# Patient Record
Sex: MALE | Race: BLACK OR AFRICAN AMERICAN | NOT HISPANIC OR LATINO | Employment: UNEMPLOYED | URBAN - METROPOLITAN AREA
[De-identification: names, ages, dates, MRNs, and addresses within clinical notes are randomized per-mention and may not be internally consistent; named-entity substitution may affect disease eponyms.]

---

## 2017-03-28 ENCOUNTER — HOSPITAL ENCOUNTER (EMERGENCY)
Facility: HOSPITAL | Age: 82
Discharge: HOME | End: 2017-03-28
Attending: EMERGENCY MEDICINE
Payer: MEDICARE

## 2017-03-28 VITALS
TEMPERATURE: 100 F | WEIGHT: 182 LBS | DIASTOLIC BLOOD PRESSURE: 63 MMHG | RESPIRATION RATE: 26 BRPM | HEART RATE: 82 BPM | SYSTOLIC BLOOD PRESSURE: 132 MMHG | OXYGEN SATURATION: 99 %

## 2017-03-28 DIAGNOSIS — R31.9 HEMATURIA: ICD-10-CM

## 2017-03-28 DIAGNOSIS — Z99.11 VENTILATOR DEPENDENT: ICD-10-CM

## 2017-03-28 DIAGNOSIS — T83.021A DISLODGED FOLEY CATHETER, INITIAL ENCOUNTER: Primary | ICD-10-CM

## 2017-03-28 PROCEDURE — 99900026 HC AIRWAY MAINTENANCE (STAT)

## 2017-03-28 PROCEDURE — 99284 EMERGENCY DEPT VISIT MOD MDM: CPT | Mod: 25

## 2017-03-28 PROCEDURE — 51702 INSERT TEMP BLADDER CATH: CPT

## 2017-03-28 PROCEDURE — 94002 VENT MGMT INPAT INIT DAY: CPT | Mod: 59

## 2017-03-28 RX ORDER — LORAZEPAM 1 MG/1
1 TABLET ORAL EVERY 6 HOURS PRN
COMMUNITY

## 2017-03-28 RX ORDER — ALBUTEROL SULFATE 2.5 MG/.5ML
2.5 SOLUTION RESPIRATORY (INHALATION) EVERY 4 HOURS PRN
COMMUNITY

## 2017-03-28 RX ORDER — FERROUS SULFATE 220 (44)/5
220 SOLUTION, ORAL ORAL DAILY
COMMUNITY

## 2017-03-28 RX ORDER — OXYCODONE HYDROCHLORIDE 5 MG/1
5 TABLET ORAL EVERY 4 HOURS PRN
COMMUNITY

## 2017-03-28 RX ORDER — ACETAMINOPHEN 325 MG/1
650 TABLET ORAL EVERY 4 HOURS PRN
COMMUNITY

## 2017-03-28 RX ORDER — SCOLOPAMINE TRANSDERMAL SYSTEM 1 MG/1
1 PATCH, EXTENDED RELEASE TRANSDERMAL
COMMUNITY

## 2017-03-28 RX ORDER — TAMSULOSIN HYDROCHLORIDE 0.4 MG/1
0.4 CAPSULE ORAL 2 TIMES DAILY
COMMUNITY
End: 2017-04-21

## 2017-03-28 RX ORDER — METOPROLOL TARTRATE 25 MG/1
25 TABLET, FILM COATED ORAL 2 TIMES DAILY
COMMUNITY

## 2017-03-28 RX ORDER — HEPARIN SODIUM 5000 [USP'U]/ML
5000 INJECTION, SOLUTION INTRAVENOUS; SUBCUTANEOUS EVERY 8 HOURS
COMMUNITY

## 2017-03-28 RX ORDER — OLANZAPINE 5 MG/1
5 TABLET ORAL 2 TIMES DAILY PRN
COMMUNITY

## 2017-03-28 RX ORDER — ASCORBIC ACID 125 MG
1 TABLET,CHEWABLE ORAL DAILY
COMMUNITY

## 2017-03-28 RX ORDER — CHLORHEXIDINE GLUCONATE ORAL RINSE 1.2 MG/ML
15 SOLUTION DENTAL 2 TIMES DAILY
COMMUNITY

## 2017-03-28 RX ORDER — FAMOTIDINE 20 MG/1
20 TABLET, FILM COATED ORAL 2 TIMES DAILY
COMMUNITY

## 2017-03-28 RX ORDER — CITALOPRAM 20 MG/1
20 TABLET, FILM COATED ORAL DAILY
COMMUNITY

## 2017-03-28 RX ORDER — IPRATROPIUM BROMIDE AND ALBUTEROL SULFATE 2.5; .5 MG/3ML; MG/3ML
3 SOLUTION RESPIRATORY (INHALATION) EVERY 6 HOURS PRN
COMMUNITY

## 2017-03-28 RX ORDER — DOCUSATE SODIUM 50 MG/5ML
50 LIQUID ORAL 2 TIMES DAILY
COMMUNITY

## 2017-03-28 NOTE — ED NOTES
Attempting to call [facility] to set up transport back to nursing home. No answer. Will continue to reach pt's nurse at nursing home.

## 2017-03-28 NOTE — DISCHARGE INSTRUCTIONS
Blood in the Urine    Blood in the urine (hematuria) has many possible causes. If it occurs after an injury (such as a car accident or fall), it is most often a sign of bruising to the kidney or bladder. Common causes of blood in the urine include urinary tract infections, kidney stones, inflammation, tumors, or certain other diseases of the kidney or bladder. Menstruation can cause blood to appear in the urine sample, although it is not coming from the urinary tract.  If only a trace amount of blood is present, it will show up on the urine test, even though the urine may be yellow and not pink or red. This may occur with any of the above conditions, as well as heavy exercise or high fever. In this case, your doctor may want to repeat the urine test on another day. This will show if the blood is still present. If it is, then other tests can be done to find out the cause.  Home care  Follow these home care guidelines:  · If your urine does not appear bloody (pink, brown or red) then you do not need to restrict your activity in any way.  · If you can see blood in your urine, rest and avoid heavy exertion until your next exam. Do not use aspirin, blood thinners, or anti-platelet or anti-inflammatory medicines. These include ibuprofen and naproxen. These thin the blood and may increase bleeding.  Follow-up care  Follow up with your healthcare provider, or as advised. If you were injured and had blood in your urine, you should have a repeat urine test in 1 to 2 days. Contact your doctor for this test.  A radiologist will review any X-rays that were taken. You will be told of any new findings that may affect your care.  When to seek medical advice  Call your healthcare provider right away if any of these occur:  · Bright red blood or blood clots in the urine (if you did not have this before)  · Weakness, dizziness or fainting  · New groin, abdominal, or back pain  · Fever of 100.4ºF (38ºC) or higher, or as directed by  your healthcare provider  · Repeated vomiting  · Bleeding from the nose or gums or easy bruising  Date Last Reviewed: 9/1/2016 © 2000-2016 [employer]. [address] [place] [address], [place] [address]. All rights reserved. This information is not intended as a substitute for professional medical care. Always follow your healthcare professional's instructions.

## 2017-03-28 NOTE — ED AVS SNAPSHOT
[facility]  [address] [address] [address]               Hayden Najera   3/28/2017  2:50 AM   ED    Description:  Male : 1931   Department:  [facility]           Your Care was Coordinated By:     Provider Role From To    Elisa Alston DO Attending Provider 17 0252 --      Reason for Visit     Male  Problem           Diagnoses this Visit        Comments    Dislodged Fisher catheter, initial encounter    -  Primary     Hematuria         Ventilator dependent           ED Disposition     ED Disposition Condition Comment    Discharge             To Do List           Follow-up Information     Follow up with Nabor Navarro IV, MD. Schedule an appointment as soon as possible for a visit in 2 days.    Specialty:  Urology    Why:  Return to emergency department for Fisher catheter not draining, fever, difficulty breathing, blood clots in urine, or other concerns.    Contact information:    [address]  [place] [id number] [phone]        [facility]     [facility] Care [facility] -  Assistance  Registered nurses in the [facility] provide clinical advisement, health education, appointment booking, and other advisory services.  Call for this free service at [phone].             Medications           Message regarding Medications     Verify the changes and/or additions to your medication regime listed below are the same as discussed with your clinician today.  If any of these changes or additions are incorrect, please notify your healthcare provider.             Verify that the below list of medications is an accurate representation of the medications you are currently taking.  If none reported, the list may be blank. If incorrect, please contact your healthcare provider. Carry this list with you in case of emergency.           Current Medications     acetaminophen (TYLENOL) 325 MG tablet 650 mg by PEG Tube  route every 4 (four) hours as needed for Pain.    chlorhexidine (PERIDEX) 0.12 % solution Use as directed 15 mLs in the mouth or throat 2 (two) times daily.    cholecalciferol, vitamin D3, 1,000 unit Chew 1 tablet by PEG Tube route once daily.    citalopram (CELEXA) 20 MG tablet 20 mg by PEG Tube route once daily.    docusate (COLACE) 50 mg/5 mL liquid 50 mg by PEG Tube route 2 (two) times daily.    famotidine (PEPCID) 20 MG tablet Take 20 mg by mouth 2 (two) times daily.    ferrous sulfate 220 mg (44 mg iron)/5 mL solution 220 mg by PEG Tube route once daily.    HEPARIN SODIUM,PORCINE (HEPARIN, PORCINE,) 5,000 unit/mL injection Inject 5,000 Units into the skin every 8 (eight) hours.    lorazepam (ATIVAN) 1 MG tablet 1 mg by PEG Tube route every 6 (six) hours as needed for Anxiety.    metoprolol tartrate (LOPRESSOR) 25 MG tablet 25 mg by PEG Tube route 2 (two) times daily.    olanzapine (ZYPREXA) 5 MG tablet 5 mg by PEG Tube route 2 (two) times daily as needed.    oxycodone (ROXICODONE) 5 MG immediate release tablet 5 mg by PEG Tube route every 4 (four) hours as needed for Pain.    scopolamine (TRANSDERM-SCOP) 1.5 mg (1 mg over 3 days) Place 1 patch onto the skin every 72 hours.    sodium chloride (SALINE NASAL) 0.65 % nasal spray 1 spray by Nasal route every 6 (six) hours as needed for Congestion.    tamsulosin (FLOMAX) 0.4 mg Cp24 0.4 mg by PEG Tube route 2 (two) times daily.    theophylline (THEODUR) 200 mg 24 hr capsule 200 mg by PEG Tube route every evening.    albuterol sulfate 2.5 mg/0.5 mL Nebu Take 2.5 mg by nebulization every 4 (four) hours as needed. Rescue    albuterol-ipratropium 2.5mg-0.5mg/3mL (DUONEB) 0.5 mg-3 mg(2.5 mg base)/3 mL nebulizer solution Take 3 mLs by nebulization every 6 (six) hours as needed for Wheezing or Shortness of Breath. Rescue           Clinical Reference Information           Your Vitals Were     BP Pulse Temp Resp Weight SpO2    120/59 (BP Location: Right arm, Patient  Position: Lying) 91 100 °F (37.8 °C) (Oral) 29 82.6 kg (182 lb) 98%      Allergies as of 3/28/2017     No Known Allergies      Immunizations Administered on Date of Encounter - 3/28/2017     None      ED Micro, Lab, POCT     None      ED Imaging Orders     None        Discharge Instructions         Blood in the Urine    Blood in the urine (hematuria) has many possible causes. If it occurs after an injury (such as a car accident or fall), it is most often a sign of bruising to the kidney or bladder. Common causes of blood in the urine include urinary tract infections, kidney stones, inflammation, tumors, or certain other diseases of the kidney or bladder. Menstruation can cause blood to appear in the urine sample, although it is not coming from the urinary tract.  If only a trace amount of blood is present, it will show up on the urine test, even though the urine may be yellow and not pink or red. This may occur with any of the above conditions, as well as heavy exercise or high fever. In this case, your doctor may want to repeat the urine test on another day. This will show if the blood is still present. If it is, then other tests can be done to find out the cause.  Home care  Follow these home care guidelines:  · If your urine does not appear bloody (pink, brown or red) then you do not need to restrict your activity in any way.  · If you can see blood in your urine, rest and avoid heavy exertion until your next exam. Do not use aspirin, blood thinners, or anti-platelet or anti-inflammatory medicines. These include ibuprofen and naproxen. These thin the blood and may increase bleeding.  Follow-up care  Follow up with your healthcare provider, or as advised. If you were injured and had blood in your urine, you should have a repeat urine test in 1 to 2 days. Contact your doctor for this test.  A radiologist will review any X-rays that were taken. You will be told of any new findings that may affect your care.  When to  seek medical advice  Call your healthcare provider right away if any of these occur:  · Bright red blood or blood clots in the urine (if you did not have this before)  · Weakness, dizziness or fainting  · New groin, abdominal, or back pain  · Fever of 100.4ºF (38ºC) or higher, or as directed by your healthcare provider  · Repeated vomiting  · Bleeding from the nose or gums or easy bruising  Date Last Reviewed: 9/1/2016 © 2000-2016 [employer]. [address] [place] [address]. All rights reserved. This information is not intended as a substitute for professional medical care. Always follow your healthcare professional's instructions.          Discharge References/Attachments     HEMATURIA (ENGLISH)    FONTANEZ CATHETER, CARE (ENGLISH)    INDWELLING URINARY CATHETER, DISCHARGE INSTRUCTIONS (ENGLISH)      [facility] Sign-Up     Activating your [facility] account is as easy as 1-2-3!     1) Visit [email or web address], select Sign Up Now, enter this activation code and your date of birth, then select Next.  [email or web address]  Expires: 5/12/2017  3:47 AM      2) Create a username and password to use when you visit [facility] in the future and select a security question in case you lose your password and select Next.    3) Enter your e-mail address and click Sign Up!    Additional Information  If you have questions, please e-mail [email or web address] or call [phone] to talk to our [facility] staff. Remember, [facility] is NOT to be used for urgent needs. For medical emergencies, dial 911.         Smoking Cessation     If you would like to quit smoking:   You may be eligible for free services if you are a [place] resident and started smoking cigarettes before September 1, 1988.  Call the Smoking Cessation Trust (SCT) toll free at (277) 472-0352 or (059) 868-6405.   Call 3-901-QUIT-NOW if you do not meet the above criteria.             [facility] complies with applicable Federal civil  rights laws and does not discriminate on the basis of race, color, national origin, age, disability, or sex.        Language Assistance Services     ATTENTION: Language assistance services are available, free of charge. Please call [phone].      ATENCIÓN: Si habla shivam, tiene a montesinos disposición servicios gratuitos de asistencia lingüística. Llame al [phone].     CHÚ Ý: N?u b?n nói Ti?ng Vi?t, có các d?ch v? h? tr? ngôn ng? mi?n phí dành cho b?n. G?i s? [phone].

## 2017-03-28 NOTE — ED NOTES
Report was given to HUMZA Figueroa at [facility]. Henry will set up transport for pt.

## 2017-03-28 NOTE — ED NOTES
Attempted to contact pt's nurse at [facility] several times with no answer. KARLA Babcock attempted to contact pt's nurse at [facility]. Will continue to try to get in touch with pt's nurse.

## 2017-03-28 NOTE — ED NOTES
16 Kiswahili coude cath inserted by KARLA Babcock using sterile technique. Clean brief placed on pt.

## 2017-03-28 NOTE — ED PROVIDER NOTES
Encounter Date: 3/28/2017       History     Chief Complaint   Patient presents with    Male  Problem     Per nursing home, pt pulled out mays catheter and has had bleeding from penis.     Review of patient's allergies indicates:  Allergies not on file  HPI Comments: CHIEF COMPLIANT: Male  Problem (Per nursing home, pt pulled out mays catheter and has had bleeding from penis.)      3/28/2017, 2:53 AM     The history is provided by the nursing home notes and [facility] . Reinaldo Blake is a 85 y.o. male  Ventilator dependant male for respiratory failure presenting to the ED for bleeding from his penis.  He had a 16 Serbian Mays catheter in place.  Patient reportedly pulled the catheter out.  Patient is nonverbal.  According to nursing home notes and [facility] staff, patient is at baseline.  There's been no temperature no fever, no cough, no diarrhea, no vomiting.   Unable to obtain further history as patient is vent dependent.     PCP: Dread Pedraza MD  Specialist:   3    The history is provided by the patient.     Past Medical History:   Diagnosis Date    Anemia     Anxiety     Aphonia     BPH (benign prostatic hyperplasia)     Chronic indwelling Mays catheter     Constipation     COPD (chronic obstructive pulmonary disease)     Dependence on ventilator, status     Depression     Dysphagia     Gastrostomy status     GERD (gastroesophageal reflux disease)     Hypertension     Major depressive disorder     Respiratory failure     Tracheostomy status     Vitamin D deficiency      Past Surgical History:   Procedure Laterality Date    GASTROSTOMY      GASTROSTOMY TUBE PLACEMENT      TRACHEOSTOMY TUBE PLACEMENT       History reviewed. No pertinent family history.  Social History   Substance Use Topics    Smoking status: Former Smoker     Types: Cigarettes    Smokeless tobacco: Never Used    Alcohol use No     Review of Systems   Unable to perform ROS: Intubated (Trach, vent  dependent)       Physical Exam   Initial Vitals   BP Pulse Resp Temp SpO2   -- -- -- -- --            Vitals:    03/28/17 0252 03/28/17 0306 03/28/17 0347 03/28/17 0351   BP: (!) 120/59  131/64    Pulse: 80 91 83 79   Resp: 18 (!) 29 (!) 21 19   Temp: 100 °F (37.8 °C)      TempSrc: Oral      SpO2: 100% 98% (!) 78% 100%   Weight: 82.6 kg (182 lb)       03/28/17 0359   BP:    Pulse:    Resp:    Temp: 99.6 °F (37.6 °C)   TempSrc: Oral   SpO2:    Weight:      Vitals:    03/28/17 0252 03/28/17 0306 03/28/17 0347 03/28/17 0351   BP: (!) 120/59  131/64    Pulse: 80 91 83 79   Resp: 18 (!) 29 (!) 21 19   Temp: 100 °F (37.8 °C)      TempSrc: Oral      SpO2: 100% 98% (!) 78% 100%   Weight: 82.6 kg (182 lb)       03/28/17 0359   BP:    Pulse:    Resp:    Temp: 99.6 °F (37.6 °C)   TempSrc: Oral   SpO2:    Weight:          Physical Exam    Nursing note and vitals reviewed.  Constitutional: He appears well-developed and well-nourished.   Obese   HENT:   Head: Normocephalic and atraumatic.   Nose: Nose normal.   Eyes: Conjunctivae are normal. Pupils are equal, round, and reactive to light.   Neck: Normal range of motion.   Trach present   Cardiovascular: Normal rate, regular rhythm and normal heart sounds. Exam reveals no friction rub.    No murmur heard.  Pulmonary/Chest: Breath sounds normal. No respiratory distress. He has no wheezes. He has no rhonchi. He has no rales.   Abdominal: Soft. Bowel sounds are normal. He exhibits no distension and no mass. There is no tenderness. There is no rebound and no guarding.   Gastrostomy tube is present.    Genitourinary:   Genitourinary Comments: Patient is circumcised.  Blood in adult diaper.  Appears the meatus is elongated with smooth edges.  At the base, there is bleeding present.    Neurological: He is alert.   Patient is nonverbal.  Looking around the room.  Not following commands.   Skin: Skin is warm. No erythema.   Psychiatric:   Unable to assess mood.             ED Course    Procedures  Labs Reviewed - No data to display                            ED Course   3:10 AM Attempted to replace 16 Hungarian mays.    Mays inserted until gentle resistence, met with return to blood.     3:30 AM Discussed with Dr. Navarro regarding return of blood.    Per his discussion, and placing a coudé catheter.  At this point time, no need for a urethrogram.    3:45 AM Coude catheter placed without difficulty.    Color but quickly cleared.    Medications - No data to display    3:46 AM Reassessment: Dr. Alston reassessed the pt.  The pt is resting comfortably and is NAD.  Pt states their sx have improved. Discussed test results, shared treatment plan, specific conditions for return, and the need for f/u.  Answered their questions at this time.  Pt understands and agrees to the plan.  The pt has remained hemodynamically stable through ED course and is stable for discharge.     Follow-up Information     Follow up with Nabor Navarro IV, MD. Schedule an appointment as soon as possible for a visit in 2 days.    Specialty:  Urology    Why:  Return to emergency department for Mays catheter not draining, fever, difficulty breathing, blood clots in urine, or other concerns.    Contact information:    [address]  [place] [address] [phone]              New Prescriptions    No medications on file        Discontinued Medications    No medications on file         Clinical Impression:       ICD-10-CM ICD-9-CM   1. Dislodged Mays catheter, initial encounter T83.021A 996.31   2. Hematuria R31.9 599.70   3. Ventilator dependent Z99.11 V46.11         Disposition:   Disposition: Discharged  Condition: Stable            Elisa Alston, DO  03/28/17 0403

## 2017-03-28 NOTE — ED NOTES
Fisher catheter inserted at this time per MD Alston order. Pt tolerated. + urine return noted. Sterile technique utilized throughout procedure.

## 2017-03-28 NOTE — ED NOTES
Pt attempting to pull on vent tubing. Pt directed to quit pulling on tubing. Pt's room light dimmed for comfort. Bed locked in lowest position, side rails up X2. Will continue to monitor.

## 2017-03-28 NOTE — ED NOTES
Patient arrived to ER via ambulance from nursing home. Patient has a trach and is ventilator dependent. Patient placed on  with the following settings: A/C , Vt 400, rate 16, peep +5 and FIO2 40%. Patient will remain on Vent while in ER.

## 2017-04-02 ENCOUNTER — HOSPITAL ENCOUNTER (EMERGENCY)
Facility: HOSPITAL | Age: 82
Discharge: OTHER INSTITUTION | End: 2017-04-02
Attending: EMERGENCY MEDICINE
Payer: MEDICARE

## 2017-04-02 VITALS
TEMPERATURE: 99 F | OXYGEN SATURATION: 100 % | RESPIRATION RATE: 20 BRPM | SYSTOLIC BLOOD PRESSURE: 122 MMHG | HEART RATE: 80 BPM | WEIGHT: 170 LBS | DIASTOLIC BLOOD PRESSURE: 65 MMHG

## 2017-04-02 DIAGNOSIS — K94.23 PEG TUBE MALFUNCTION: Primary | ICD-10-CM

## 2017-04-02 PROCEDURE — 49450 REPLACE G/C TUBE PERC: CPT

## 2017-04-02 PROCEDURE — 99284 EMERGENCY DEPT VISIT MOD MDM: CPT | Mod: 25

## 2017-04-02 PROCEDURE — 43760 *HC REPLACEMENT GASTROS TUBE: CPT

## 2017-04-02 NOTE — ED AVS SNAPSHOT
[facility]  [address] [address] [address]               Hayden Najera   2017  1:40 AM   ED    Description:  Male : 1931   Department:  [facility]           Your Care was Coordinated By:     Provider Role From To    Saul Escobar MD Attending Provider 17 0145 --      Reason for Visit     PEG tube removed           Diagnoses this Visit        Comments    PEG tube malfunction    -  Primary       ED Disposition     ED Disposition Condition Comment    Discharge             To Do List           Follow-up Information     Follow up with Dread Pedraza MD. Call in 1 day.    Specialty:  Internal Medicine    Why:  As needed    Contact information:    [address] [facility]  [address]  [facility] [address]          Follow up with [facility].    Specialty:  Emergency Medicine    Why:  If symptoms worsen    Contact information:    [address] [address] [id number] [phone]      [facility]     [facility] Care [facility] -  Assistance  Unless otherwise directed by your provider, please contact [facility], our nurse care line that is available for  assistance.     Registered nurses in the [facility] provide: appointment scheduling, clinical advisement, health education, and other advisory services.  Call: [phone] (toll free)               Medications           Message regarding Medications     Verify the changes and/or additions to your medication regime listed below are the same as discussed with your clinician today.  If any of these changes or additions are incorrect, please notify your healthcare provider.             Verify that the below list of medications is an accurate representation of the medications you are currently taking.  If none reported, the list may be blank. If incorrect, please contact your healthcare provider. Carry this list with you in case of  emergency.           Current Medications     acetaminophen (TYLENOL) 325 MG tablet 650 mg by PEG Tube route every 4 (four) hours as needed for Pain.    albuterol sulfate 2.5 mg/0.5 mL Nebu Take 2.5 mg by nebulization every 4 (four) hours as needed. Rescue    albuterol-ipratropium 2.5mg-0.5mg/3mL (DUONEB) 0.5 mg-3 mg(2.5 mg base)/3 mL nebulizer solution Take 3 mLs by nebulization every 6 (six) hours as needed for Wheezing or Shortness of Breath. Rescue    chlorhexidine (PERIDEX) 0.12 % solution Use as directed 15 mLs in the mouth or throat 2 (two) times daily.    cholecalciferol, vitamin D3, 1,000 unit Chew 1 tablet by PEG Tube route once daily.    citalopram (CELEXA) 20 MG tablet 20 mg by PEG Tube route once daily.    docusate (COLACE) 50 mg/5 mL liquid 50 mg by PEG Tube route 2 (two) times daily.    famotidine (PEPCID) 20 MG tablet Take 20 mg by mouth 2 (two) times daily.    ferrous sulfate 220 mg (44 mg iron)/5 mL solution 220 mg by PEG Tube route once daily.    HEPARIN SODIUM,PORCINE (HEPARIN, PORCINE,) 5,000 unit/mL injection Inject 5,000 Units into the skin every 8 (eight) hours.    lorazepam (ATIVAN) 1 MG tablet 1 mg by PEG Tube route every 6 (six) hours as needed for Anxiety.    metoprolol tartrate (LOPRESSOR) 25 MG tablet 25 mg by PEG Tube route 2 (two) times daily.    olanzapine (ZYPREXA) 5 MG tablet 5 mg by PEG Tube route 2 (two) times daily as needed.    oxycodone (ROXICODONE) 5 MG immediate release tablet 5 mg by PEG Tube route every 4 (four) hours as needed for Pain.    scopolamine (TRANSDERM-SCOP) 1.5 mg (1 mg over 3 days) Place 1 patch onto the skin every 72 hours.    sodium chloride (SALINE NASAL) 0.65 % nasal spray 1 spray by Nasal route every 6 (six) hours as needed for Congestion.    tamsulosin (FLOMAX) 0.4 mg Cp24 0.4 mg by PEG Tube route 2 (two) times daily.    theophylline (THEODUR) 200 mg 24 hr capsule 200 mg by PEG Tube route every evening.           Clinical Reference Information            Your Vitals Were     BP Pulse Temp Resp Weight SpO2    122/65 (BP Location: Left arm, Patient Position: Sitting) 80 99.2 °F (37.3 °C) 20 77.1 kg (170 lb) 100%      Allergies as of 4/2/2017     No Known Allergies      Immunizations Administered on Date of Encounter - 4/2/2017     None      ED Micro, Lab, POCT     None      ED Imaging Orders     Start Ordered       Status Ordering Provider    04/02/17 0147 04/02/17 0146  X-Ray Abdomen AP 1 View (KUB)  1 time imaging      Ordered       Discharge References/Attachments     FEEDING TUBE REPLACEMENT (ENGLISH)      [facility] Sign-Up     Activating your [facility] account is as easy as 1-2-3!     1) Visit [email or web address], select Sign Up Now, enter this activation code and your date of birth, then select Next.  [email or web address]  Expires: 5/12/2017  3:47 AM      2) Create a username and password to use when you visit [facility] in the future and select a security question in case you lose your password and select Next.    3) Enter your e-mail address and click Sign Up!    Additional Information  If you have questions, please e-mail [email or web address] or call [phone] to talk to our [facility] staff. Remember, [facility] is NOT to be used for urgent needs. For medical emergencies, dial 911.          [facility] complies with applicable Federal civil rights laws and does not discriminate on the basis of race, color, national origin, age, disability, or sex.        Language Assistance Services     ATTENTION: Language assistance services are available, free of charge. Please call [phone].      ATENCIÓN: Si habla español, tiene a montesinos disposición servicios gratuitos de asistencia lingüística. Llame al [phone].     CHÚ Ý: N?u b?n nói Ti?ng Vi?t, có các d?ch v? h? tr? ngôn ng? mi?n phí dành cho b?n. G?i s? [phone].

## 2017-04-02 NOTE — ED NOTES
PEG tube (16) replaced per ERMD.  50ML  gastrograffin instilled per PEG & xray verified placement per ERMD
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
## 2017-04-02 NOTE — ED PROVIDER NOTES
Encounter Date: 4/2/2017       History     Chief Complaint   Patient presents with    PEG tube removed     reported by [facility], pt pulled out, balloon intact     Review of patient's allergies indicates:  No Known Allergies  HPI   Pt with PEG tube dislodged for 1.5 hours pta. Pt arrived via EMS s c/o.     Past Medical History:   Diagnosis Date    Anemia     Anxiety     Aphonia     BPH (benign prostatic hyperplasia)     Chronic indwelling Fisher catheter     Constipation     COPD (chronic obstructive pulmonary disease)     Dependence on ventilator, status     Depression     Dysphagia     Gastrostomy status     GERD (gastroesophageal reflux disease)     Hypertension     Major depressive disorder     Respiratory failure     Tracheostomy status     Vitamin D deficiency      Past Surgical History:   Procedure Laterality Date    GASTROSTOMY      GASTROSTOMY TUBE PLACEMENT      TRACHEOSTOMY TUBE PLACEMENT       History reviewed. No pertinent family history.  Social History   Substance Use Topics    Smoking status: Former Smoker     Types: Cigarettes    Smokeless tobacco: Never Used    Alcohol use No     Review of Systems   Gastrointestinal:        PEG tube dislodged   All other systems reviewed and are negative.      Physical Exam   Initial Vitals   BP Pulse Resp Temp SpO2   04/02/17 0140 04/02/17 0140 04/02/17 0140 04/02/17 0140 04/02/17 0140   122/65 80 20 99.2 °F (37.3 °C) 100 %     Physical Exam    Nursing note and vitals reviewed.  Constitutional: He appears well-developed and well-nourished. No distress.   HENT:   Head: Normocephalic and atraumatic.   Eyes: EOM are normal. Pupils are equal, round, and reactive to light.   Cardiovascular: Normal rate and regular rhythm.   Pulmonary/Chest: Breath sounds normal. No respiratory distress.   Abdominal: Soft. Bowel sounds are normal.   Patent track gastric tube- epigastrum   Musculoskeletal: Normal range of motion.   Neurological: He is  alert. He has normal strength.   Skin: Skin is warm and dry.   Psychiatric: He has a normal mood and affect.         ED Course   Procedures  Labs Reviewed - No data to display     1:50 AM Replaced PEG tube with 16 Turkish PEG tube inflated with 20 ml Saline.   2:11 AM KUB c Gastrograffin : confirmed gastric position of PEG tube.                       ED Course     Clinical Impression:   The encounter diagnosis was PEG tube malfunction.    Disposition:   Disposition: Discharged  Condition: Stable       Saul Escobar MD  04/02/17 0212

## 2017-04-13 ENCOUNTER — TELEPHONE (OUTPATIENT)
Dept: UROLOGY | Facility: CLINIC | Age: 82
End: 2017-04-13

## 2017-04-21 ENCOUNTER — OFFICE VISIT (OUTPATIENT)
Dept: UROLOGY | Facility: CLINIC | Age: 82
End: 2017-04-21
Payer: MEDICARE

## 2017-04-21 VITALS — DIASTOLIC BLOOD PRESSURE: 78 MMHG | HEART RATE: 68 BPM | SYSTOLIC BLOOD PRESSURE: 118 MMHG

## 2017-04-21 DIAGNOSIS — R33.9 URINARY RETENTION: Primary | ICD-10-CM

## 2017-04-21 PROCEDURE — 51702 INSERT TEMP BLADDER CATH: CPT | Mod: S$PBB,,, | Performed by: UROLOGY

## 2017-04-21 PROCEDURE — 99204 OFFICE O/P NEW MOD 45 MIN: CPT | Mod: S$PBB,25,, | Performed by: UROLOGY

## 2017-04-21 NOTE — PROGRESS NOTES
Catheter change ordered per Dr. Navarro. Patient had a 16 fr indwelling mays catheter with 30 ml balloon. I deflated the 30 ml balloon and withdrew catheter from patient's bladder. Using sterile technique, I inserted a 16fr indwelling mays catheter with 10ml bulb into patient's bladder.  Noticed clear, yellow urine return and inflated 10ml bulb. Patient tolerated well.

## 2017-04-21 NOTE — PROGRESS NOTES
Chief Complaint: Urine Retention    HPI:   4/21/17: Aphasic 87 yo man sent by his nursing home without escort or historical information as followup to an accidental mays removal last month.  He was seen in the ER on that occasion and discharged with a new mays.  Discussed pt with the nursing director at his home.  No acute complaints she knew about.  Basically a hospital followup.  Unknown last time to mays change.  Mays not positioned correctly in leg-clip.  Is on flomax.      Allergies:  Review of patient's allergies indicates no known allergies.    Medications: has a current medication list which includes the following prescription(s): acetaminophen, albuterol sulfate, albuterol-ipratropium 2.5mg-0.5mg/3ml, chlorhexidine, cholecalciferol (vitamin d3), citalopram, docusate, famotidine, ferrous sulfate, heparin (porcine), lorazepam, metoprolol tartrate, olanzapine, oxycodone, scopolamine, sodium chloride, tamsulosin, and theophylline.    Review of Systems:  General: No fever, chills, fatigability, or weight loss.  Skin: No rashes, itching, or changes in color or texture of skin.  Chest: Denies HAIDER, cyanosis, wheezing, cough, and sputum production. (on O2-trach)  Abdomen: Appetite fine. No weight loss. Denies diarrhea, abdominal pain, hematemesis, or blood in stool. (Peg tube)  Musculoskeletal: No joint stiffness or swelling. Denies back pain.  : As above.  All other review of systems negative.    PMH:   has a past medical history of Anemia; Anxiety; Aphonia; BPH (benign prostatic hyperplasia); Chronic indwelling Mays catheter; Constipation; COPD (chronic obstructive pulmonary disease); Dependence on ventilator, status; Depression; Dysphagia; Gastrostomy status; GERD (gastroesophageal reflux disease); Hypertension; Major depressive disorder; Respiratory failure; Tracheostomy status; and Vitamin D deficiency.    PSH:   has a past surgical history that includes Tracheostomy tube placement; Gastrostomy; and  Gastrostomy tube placement.    FamHx: family history is not on file.    SocHx:  reports that he has quit smoking. His smoking use included Cigarettes. He has never used smokeless tobacco. He reports that he does not drink alcohol or use illicit drugs.     Physical Exam:  Vitals: There were no vitals filed for this visit.  General: A&Ox3. No apparent distress. No deformities.  Neck: No masses. Normal thyroid.  Lungs: normal inspiration. No use of accessory muscles.  Heart: normal pulse. No arrhythmias.  Abdomen: Soft. NT. ND. No masses. No hernias. No hepatosplenomegaly.  Lymphatic: Neck and groin nodes negative.  Skin: The skin is warm and dry. No jaundice.  Ext: No c/c/e.  : Test desc mannie, mays in bladder but improperly secured.  Changed mays in office today.  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      Labs/Studies:     Impression/Plan:   1. No acute problems appreciated except malpositioned, old mays.  Replaced and secured properly.  Mays change monthly.  RTC prn.  2. Stop flomax - no need.

## 2017-04-23 ENCOUNTER — HOSPITAL ENCOUNTER (EMERGENCY)
Facility: HOSPITAL | Age: 82
Discharge: HOME | End: 2017-04-23
Attending: EMERGENCY MEDICINE
Payer: MEDICARE

## 2017-04-23 VITALS
HEART RATE: 92 BPM | OXYGEN SATURATION: 97 % | TEMPERATURE: 98 F | HEIGHT: 63 IN | BODY MASS INDEX: 30.12 KG/M2 | WEIGHT: 170 LBS | SYSTOLIC BLOOD PRESSURE: 146 MMHG | DIASTOLIC BLOOD PRESSURE: 79 MMHG | RESPIRATION RATE: 20 BRPM

## 2017-04-23 DIAGNOSIS — Z46.59 ENCOUNTER FOR FEEDING TUBE PLACEMENT: Primary | ICD-10-CM

## 2017-04-23 PROCEDURE — 99284 EMERGENCY DEPT VISIT MOD MDM: CPT | Mod: 25

## 2017-04-23 PROCEDURE — 25500020 PHARM REV CODE 255: Performed by: EMERGENCY MEDICINE

## 2017-04-23 PROCEDURE — 43760 *HC REPLACEMENT GASTROS TUBE: CPT

## 2017-04-23 RX ADMIN — DIATRIZOATE MEGLUMINE AND DIATRIZOATE SODIUM 50 ML: 600; 100 SOLUTION ORAL; RECTAL at 11:04

## 2017-04-23 NOTE — ED AVS SNAPSHOT
[facility]  [address] [address] [place] [address]               Hayden Najera   2017 10:45 AM   ED    Description:  Male : 1931   Department:  [facility]           Your Care was Coordinated By:     Provider Role From To    Nathanael Stewart Jr., MD Attending Provider 17 1049 --      Reason for Visit     PEG tube replacement           Diagnoses this Visit        Comments    Encounter for feeding tube placement    -  Primary       ED Disposition     ED Disposition Condition Comment    Discharge             To Do List           Follow-up Information     Follow up with Dread Pedraza MD.    Specialty:  Internal Medicine    Why:  As needed    Contact information:    [address]  [facility] [address]        [facility]     [facility] On [facility] Nurse Care Line -  Assistance  Unless otherwise directed by your provider, please contact [facility], our nurse care line that is available for  assistance.     Registered nurses in the [facility] provide: appointment scheduling, clinical advisement, health education, and other advisory services.  Call: [phone] (toll free)               Medications           Message regarding Medications     Verify the changes and/or additions to your medication regime listed below are the same as discussed with your clinician today.  If any of these changes or additions are incorrect, please notify your healthcare provider.             Verify that the below list of medications is an accurate representation of the medications you are currently taking.  If none reported, the list may be blank. If incorrect, please contact your healthcare provider. Carry this list with you in case of emergency.           Current Medications     acetaminophen (TYLENOL) 325 MG tablet 650 mg by PEG Tube route every 4 (four) hours as needed for Pain.    albuterol sulfate 2.5 mg/0.5 mL Nebu Take 2.5 mg by  "nebulization every 4 (four) hours as needed. Rescue    albuterol-ipratropium 2.5mg-0.5mg/3mL (DUONEB) 0.5 mg-3 mg(2.5 mg base)/3 mL nebulizer solution Take 3 mLs by nebulization every 6 (six) hours as needed for Wheezing or Shortness of Breath. Rescue    chlorhexidine (PERIDEX) 0.12 % solution Use as directed 15 mLs in the mouth or throat 2 (two) times daily.    cholecalciferol, vitamin D3, 1,000 unit Chew 1 tablet by PEG Tube route once daily.    citalopram (CELEXA) 20 MG tablet 20 mg by PEG Tube route once daily.    docusate (COLACE) 50 mg/5 mL liquid 50 mg by PEG Tube route 2 (two) times daily.    famotidine (PEPCID) 20 MG tablet Take 20 mg by mouth 2 (two) times daily.    ferrous sulfate 220 mg (44 mg iron)/5 mL solution 220 mg by PEG Tube route once daily.    HEPARIN SODIUM,PORCINE (HEPARIN, PORCINE,) 5,000 unit/mL injection Inject 5,000 Units into the skin every 8 (eight) hours.    lorazepam (ATIVAN) 1 MG tablet 1 mg by PEG Tube route every 6 (six) hours as needed for Anxiety.    metoprolol tartrate (LOPRESSOR) 25 MG tablet 25 mg by PEG Tube route 2 (two) times daily.    olanzapine (ZYPREXA) 5 MG tablet 5 mg by PEG Tube route 2 (two) times daily as needed.    oxycodone (ROXICODONE) 5 MG immediate release tablet 5 mg by PEG Tube route every 4 (four) hours as needed for Pain.    scopolamine (TRANSDERM-SCOP) 1.5 mg (1 mg over 3 days) Place 1 patch onto the skin every 72 hours.    sodium chloride (SALINE NASAL) 0.65 % nasal spray 1 spray by Nasal route every 6 (six) hours as needed for Congestion.    theophylline (THEODUR) 200 mg 24 hr capsule 200 mg by PEG Tube route every evening.           Clinical Reference Information           Your Vitals Were     BP Pulse Temp Resp Height Weight    146/79 (BP Location: Right arm, Patient Position: Sitting) 92 98.4 °F (36.9 °C) (Axillary) 20 5' 3" (1.6 m) 77.1 kg (170 lb)    SpO2 BMI             97% 30.11 kg/m2         Allergies as of 4/23/2017     No Known Allergies    "   Immunizations Administered on Date of Encounter - 4/23/2017     None      ED Micro, Lab, POCT     None      ED Imaging Orders     Start Ordered       Status Ordering Provider    04/23/17 1051 04/23/17 1050  XR Gastric Tube Check With Contrast  1 time imaging      Acknowledged       Discharge References/Attachments     PEG TUBE FEEDING, UNDERSTANDING (ENGLISH)    FEEDING TUBE REPLACEMENT (ENGLISH)      Smoking Cessation     If you would like to quit smoking:   You may be eligible for free services if you are a [place] resident and started smoking cigarettes before September 1, 1988.  Call the Smoking Cessation Trust ([facility]) toll free at [phone] or [phone].   Call 5-849-QUIT-NOW if you do not meet the above criteria.   Contact us via email: [email or web address]   View our website for more information: [email or web address]         [facility] complies with applicable Federal civil rights laws and does not discriminate on the basis of race, color, national origin, age, disability, or sex.        Language Assistance Services     ATTENTION: Language assistance services are available, free of charge. Please call [phone].      ATENCIÓN: Si habla español, tiene a montesinos disposición servicios gratuitos de asistencia lingüística. Llame al [phone].     CHÚ Ý: N?u b?n nói Ti?ng Vi?t, có các d?ch v? h? tr? ngôn ng? mi?n phí dành cho b?n. G?i s? [phone].

## 2017-04-23 NOTE — ED NOTES
Xray done and Dr. Stewart verified in place. pts tube flushed well per RT post gastro graffin solution.dry new dsg applied and secured with paper tape.

## 2017-04-23 NOTE — ED PROVIDER NOTES
Encounter Date: 4/23/2017       History     Chief Complaint   Patient presents with    PEG tube replacement     Review of patient's allergies indicates:  No Known Allergies  HPI Comments: 87 y/o male bedbound nursing homoe resident here after his feedin tube was pulled out this AM. Pt here for feeding tube replacement pt chornically debilitated with peg/ trach with no acute complaint pt at baseline per paramedics    Patient is a 86 y.o. male presenting with the following complaint: GI illness. The history is provided by the nursing home.   GI Problem   The other symptoms of the illness do not include fever, nausea or vomiting.   The patient has not had a change in bowel habit. Risk factors for an acute abdominal problem include being elderly.     Past Medical History:   Diagnosis Date    Anemia     Anxiety     Aphonia     BPH (benign prostatic hyperplasia)     Chronic indwelling Fisher catheter     Constipation     COPD (chronic obstructive pulmonary disease)     Dependence on ventilator, status     Depression     Dysphagia     Gastrostomy status     GERD (gastroesophageal reflux disease)     Hypertension     Major depressive disorder     Respiratory failure     Tracheostomy status     Vitamin D deficiency      Past Surgical History:   Procedure Laterality Date    GASTROSTOMY      GASTROSTOMY TUBE PLACEMENT      TRACHEOSTOMY TUBE PLACEMENT       History reviewed. No pertinent family history.  Social History   Substance Use Topics    Smoking status: Former Smoker     Types: Cigarettes    Smokeless tobacco: Never Used    Alcohol use No     Review of Systems   Constitutional: Negative for fever.   Gastrointestinal: Negative for nausea and vomiting.   All other systems reviewed and are negative.      Physical Exam   Initial Vitals   BP Pulse Resp Temp SpO2   04/23/17 1048 04/23/17 1048 04/23/17 1048 04/23/17 1048 04/23/17 1048   146/79 92 20 98.4 °F (36.9 °C) 97 %     Physical Exam    Nursing note  "and vitals reviewed.  Constitutional: He appears well-developed and well-nourished.   Debilitated elderly male  S/p peg and tracheostomy here for peg tube replacement in no distress   HENT:   Head: Normocephalic and atraumatic.   Eyes: Conjunctivae and EOM are normal. Pupils are equal, round, and reactive to light.   Neck: Normal range of motion. Neck supple.   Cardiovascular: Normal rate, regular rhythm, normal heart sounds and intact distal pulses.   Pulmonary/Chest: Breath sounds normal.   Abdominal: Soft. Bowel sounds are normal. He exhibits no distension and no mass. There is no tenderness. There is no rebound and no guarding.   Feeding tube stoma intact with minimal breakdown   Musculoskeletal: Normal range of motion.   Neurological: He is alert and oriented to person, place, and time. He has normal strength and normal reflexes.   Skin: Skin is warm and dry.   Psychiatric: He has a normal mood and affect. His behavior is normal. Judgment and thought content normal.         ED Course   Feeding Tube  Date/Time: 4/23/2017 10:58 AM  Performed by: DARÍO ROCHE JR  Authorized by: DARÍO ROCHE JR   Consent: The procedure was performed in an emergent situation. Verbal consent not obtained. Written consent not obtained.  Risks and benefits: risks, benefits and alternatives were discussed  Patient understanding: patient states understanding of the procedure being performed  Patient identity confirmed: verbally with patient  Time out: Immediately prior to procedure a "time out" was called to verify the correct patient, procedure, equipment, support staff and site/side marked as required.  Indications: tube removed by patient  Preparation: Patient was prepped and draped in the usual sterile fashion.  Sedation:  Patient sedated: no    Local anesthesia used: no    Anesthesia:  Local anesthesia used: no  Tube type: gastrostomy  Patient position: sitting  Procedure type: replacement  Endoscope used: no  Bulb inflation " fluid: normal saline  Placement/position confirmation: x-ray, auscultation and gastric contents aspirated  Tube placement difficulty: none  Complications: No  Estimated blood loss (mL): 0  Specimens: No  Implants: No  Patient tolerance: Patient tolerated the procedure well with no immediate complications        Labs Reviewed - No data to display                 Vitals:    04/23/17 1048   BP: (!) 146/79   Pulse: 92   Resp: 20   Temp: 98.4 °F (36.9 °C)       Imaging Results         XR Gastric Tube Check With Contrast (Final result) Result time:  04/23/17 11:18:49    Final result by Danie Natarajan MD (04/23/17 11:18:49)    Impression:           1. Negative for acute process involving the abdomen or pelvis.  2.  The contrast injected into the PEG tube flows into the bowel without extravasation  3. Numerous stable findings as noted above.      Electronically signed by: DANIE NATARAJAN MD  Date:     04/23/17  Time:    11:18     Narrative:    KUB:    Clinical Indication: Encounter for fitting and adjustment of nonvascular catheter.     Findings:    Comparison made to 04/02/2017.  Contrast was injected into the indwelling peg tube.  The contrast flows into the stomach and small bowel without extravasation.   The abdominal gas pattern is otherwise normal. No sign of bowel dilation, air/fluid levels or free air.  Mildly increased stool.    Stable vascular calcifications.  Stable degenerative changes of the thoracic and lumbar spine with convex-right curvature of the thoracic spine.              No results found for this or any previous visit.                ED Course     Clinical Impression:       ICD-10-CM ICD-9-CM   1. Encounter for feeding tube placement Z78.9 V49.9         Disposition:   Disposition: Discharged  Condition: Stable       Nathanael Stewart Jr., MD  04/23/17 1524

## 2017-04-23 NOTE — ED NOTES
Dr. Stewart insterted, with ease, a 20french gastro tube. Gastric content returned. Xray at bedside to confirm placement.